# Patient Record
Sex: MALE | Race: WHITE | NOT HISPANIC OR LATINO | URBAN - METROPOLITAN AREA
[De-identification: names, ages, dates, MRNs, and addresses within clinical notes are randomized per-mention and may not be internally consistent; named-entity substitution may affect disease eponyms.]

---

## 2022-04-22 ENCOUNTER — OFFICE (OUTPATIENT)
Dept: URBAN - METROPOLITAN AREA CLINIC 34 | Facility: CLINIC | Age: 82
End: 2022-04-22
Payer: COMMERCIAL

## 2022-04-22 VITALS — WEIGHT: 160 LBS | HEIGHT: 71 IN

## 2022-04-22 DIAGNOSIS — I45.6 PRE-EXCITATION SYNDROME: ICD-10-CM

## 2022-04-22 DIAGNOSIS — R05.9 COUGH, UNSPECIFIED: ICD-10-CM

## 2022-04-22 DIAGNOSIS — K21.9 GASTRO-ESOPHAGEAL REFLUX DISEASE WITHOUT ESOPHAGITIS: ICD-10-CM

## 2022-04-22 PROCEDURE — 99204 OFFICE O/P NEW MOD 45 MIN: CPT | Performed by: INTERNAL MEDICINE

## 2022-04-22 RX ORDER — OMEPRAZOLE 20 MG/1
TABLET, DELAYED RELEASE ORAL
Qty: 60 | Refills: 1 | Status: ACTIVE

## 2022-04-22 NOTE — SERVICEHPINOTES
LOUISA DOYLE   is a   81   year old male who is being seen in consultation at the request of   NIRAV DUMONT   for complaints of constantly needing to clear throat. This has been going on for years. In 2016 he had similar complaints. He is not currently on a PPI. Throat clearing has been very loud. No nighttime reflux, cough. No AM sore throat or hoarseness. Symptoms typically get exacerbated by eating or drinking but he denies any dysphagia.  No prior evaluation. No abdominal pain, nausea, vomiting, diarrhea, constipation, melena, rectal bleeding, loss of appetite, weight loss. Has a history of WPW previously followed by Dr. Love.   leigha ahuja

## 2022-08-26 ENCOUNTER — ON CAMPUS - OUTPATIENT (OUTPATIENT)
Dept: URBAN - METROPOLITAN AREA HOSPITAL 16 | Facility: HOSPITAL | Age: 82
End: 2022-08-26
Payer: COMMERCIAL

## 2022-08-26 DIAGNOSIS — K29.60 OTHER GASTRITIS WITHOUT BLEEDING: ICD-10-CM

## 2022-08-26 DIAGNOSIS — K21.9 GASTRO-ESOPHAGEAL REFLUX DISEASE WITHOUT ESOPHAGITIS: ICD-10-CM

## 2022-08-26 PROCEDURE — 43239 EGD BIOPSY SINGLE/MULTIPLE: CPT | Performed by: INTERNAL MEDICINE
